# Patient Record
Sex: FEMALE | Race: WHITE | URBAN - METROPOLITAN AREA
[De-identification: names, ages, dates, MRNs, and addresses within clinical notes are randomized per-mention and may not be internally consistent; named-entity substitution may affect disease eponyms.]

---

## 2022-03-21 ENCOUNTER — OFFICE (OUTPATIENT)
Dept: URBAN - METROPOLITAN AREA CLINIC 19 | Facility: CLINIC | Age: 77
End: 2022-03-21

## 2022-03-21 VITALS
DIASTOLIC BLOOD PRESSURE: 68 MMHG | HEART RATE: 70 BPM | WEIGHT: 130 LBS | SYSTOLIC BLOOD PRESSURE: 147 MMHG | HEIGHT: 64 IN | OXYGEN SATURATION: 99 %

## 2022-03-21 DIAGNOSIS — R49.0 DYSPHONIA: ICD-10-CM

## 2022-03-21 DIAGNOSIS — R13.10 DYSPHAGIA, UNSPECIFIED: ICD-10-CM

## 2022-03-21 DIAGNOSIS — R14.0 ABDOMINAL DISTENSION (GASEOUS): ICD-10-CM

## 2022-03-21 DIAGNOSIS — R14.2 ERUCTATION: ICD-10-CM

## 2022-03-21 PROCEDURE — 99203 OFFICE O/P NEW LOW 30 MIN: CPT | Performed by: INTERNAL MEDICINE

## 2022-03-21 NOTE — SERVICENOTES
No new medications. dysphagia sabine with pills, Gaviscon helps. will have abdominal pressure and atypical chest discomfort with bloating/gas. Relieved with belching. SIBO?

seen.examined; agree with above h&p.  excessive belching. abd exam benign.  plan UGI; cologuard for CRC screening due to pt preference for noninvasive testing

## 2022-03-21 NOTE — SERVICEHPINOTES
Ms. Mckeon is a 77-year-old female here he was due for a screening colonoscopy, recent worsening dysphagia with pills which appears more oropharyngeal in nature, hoarseness, and excessive belching and bloating. The patient is due for a screening colonoscopy but is not interested in any invasive procedures at this time. The patient reports she has had some difficulty with pills but recently has noted she has worsening symptoms swallowing larger pills as well as hoarseness and dry mouth. She denies any reflux type symptoms but will occasionally take over-the-counter products such as Gaviscon or Tums for occasional discomfort. She also reports having ongoing excessive belching. She denies having any flatulence. She states that she does have associated bloating. She reports that the bloating does dissipate after belching. She reports that this is embarrassing. She also states that she will have chest discomfort and abdominal pressure with bloating. She reports having intermittent constipation. She does try to drink approximately 64 oz of water daily. She denies any melena, hematochezia, or unintentional weight loss.